# Patient Record
Sex: MALE | Race: WHITE | NOT HISPANIC OR LATINO
[De-identification: names, ages, dates, MRNs, and addresses within clinical notes are randomized per-mention and may not be internally consistent; named-entity substitution may affect disease eponyms.]

---

## 2019-01-11 ENCOUNTER — TRANSCRIPTION ENCOUNTER (OUTPATIENT)
Age: 69
End: 2019-01-11

## 2021-09-27 PROBLEM — Z00.00 ENCOUNTER FOR PREVENTIVE HEALTH EXAMINATION: Status: ACTIVE | Noted: 2021-09-27

## 2021-09-28 ENCOUNTER — APPOINTMENT (OUTPATIENT)
Dept: PEDIATRIC ORTHOPEDIC SURGERY | Facility: CLINIC | Age: 71
End: 2021-09-28
Payer: MEDICARE

## 2021-09-28 VITALS — WEIGHT: 240 LBS | TEMPERATURE: 97.2 F | BODY MASS INDEX: 32.51 KG/M2 | HEIGHT: 72 IN

## 2021-09-28 DIAGNOSIS — S46.011A STRAIN OF MUSCLE(S) AND TENDON(S) OF THE ROTATOR CUFF OF RIGHT SHOULDER, INITIAL ENCOUNTER: ICD-10-CM

## 2021-09-28 DIAGNOSIS — Z86.73 PERSONAL HISTORY OF TRANSIENT ISCHEMIC ATTACK (TIA), AND CEREBRAL INFARCTION W/OUT RESIDUAL DEFICITS: ICD-10-CM

## 2021-09-28 DIAGNOSIS — S46.012A STRAIN OF MUSCLE(S) AND TENDON(S) OF THE ROTATOR CUFF OF LEFT SHOULDER, INITIAL ENCOUNTER: ICD-10-CM

## 2021-09-28 DIAGNOSIS — Z86.79 PERSONAL HISTORY OF OTHER DISEASES OF THE CIRCULATORY SYSTEM: ICD-10-CM

## 2021-09-28 PROCEDURE — 99213 OFFICE O/P EST LOW 20 MIN: CPT | Mod: 25

## 2021-09-28 PROCEDURE — 73030 X-RAY EXAM OF SHOULDER: CPT | Mod: 50

## 2021-09-28 PROCEDURE — 20610 DRAIN/INJ JOINT/BURSA W/O US: CPT | Mod: RT

## 2021-09-28 NOTE — PROCEDURE
[FreeTextEntry1] : Under sterile technique with a Betadine prep the right subacromial space was injected using a 21-gauge needle with 40 mg of Depo-Medrol and 5 cc of 1% lidocaine with a Band-Aid dressing applied.  This was tolerated well postinjection instructions were conveyed to the patient.

## 2021-09-28 NOTE — PHYSICAL EXAM
[de-identified] : Emanation: Reveals he has reasonable motion to the cervical spine without spasm or tenderness.  Both shoulders are symmetric in appearance no swelling/atrophy.  He has restricted motion in abduction and full external rotation right greater than left he can forward flex and abduct above the horizontal plane though it is with pain again right greater than left.  There is no crepitus on motion clicking or popping.  No instability on stress.  He does have tenderness in the subacromial space and greater tuberosity right more so than left.  He is neurologically intact.\par \par X-rays of both shoulders taken today 2 views each reveal degenerative change to the greater tuberosity the glenohumeral joint reveals minimal change appropriate for his age.

## 2021-09-28 NOTE — HISTORY OF PRESENT ILLNESS
[de-identified] : This 71-year-old right-handed young man is seen for evaluation of bilateral shoulder pain right greater than left.  This has been present for a couple of months.  He has noted stiffness and pain reaching and lifting above head.  Again right greater than left.  He has no significant neck pain over time.  His past history includes right total knee replacement as well as insertion of cardiac stent and a mild CVA.  He is on Plavix.

## 2021-09-28 NOTE — ASSESSMENT
[FreeTextEntry1] : Impression: Bilateral strain rotator cuff more symptomatic on the right.\par \par The right subacromial space was injected with Depo-Medrol and lidocaine.  He will use Tylenol for pain.  If he is not improving physical therapy may become necessary should the left shoulder become more symptomatic he may return for injection in the shoulder.  He otherwise will return as necessary.